# Patient Record
Sex: MALE | URBAN - METROPOLITAN AREA
[De-identification: names, ages, dates, MRNs, and addresses within clinical notes are randomized per-mention and may not be internally consistent; named-entity substitution may affect disease eponyms.]

---

## 2022-05-31 ENCOUNTER — OFFICE VISIT (OUTPATIENT)
Dept: URBAN - METROPOLITAN AREA CLINIC 28 | Facility: CLINIC | Age: 62
End: 2022-05-31
Payer: COMMERCIAL

## 2022-05-31 DIAGNOSIS — H25.13 AGE-RELATED NUCLEAR CATARACT, BILATERAL: ICD-10-CM

## 2022-05-31 DIAGNOSIS — H40.1132 BILATERAL PRIMARY OPEN-ANGLE GLAUCOMA, MODERATE STAGE: Primary | ICD-10-CM

## 2022-05-31 PROCEDURE — 76514 ECHO EXAM OF EYE THICKNESS: CPT | Performed by: OPHTHALMOLOGY

## 2022-05-31 PROCEDURE — 99204 OFFICE O/P NEW MOD 45 MIN: CPT | Performed by: OPHTHALMOLOGY

## 2022-05-31 PROCEDURE — 92133 CPTRZD OPH DX IMG PST SGM ON: CPT | Performed by: OPHTHALMOLOGY

## 2022-05-31 PROCEDURE — 92083 EXTENDED VISUAL FIELD XM: CPT | Performed by: OPHTHALMOLOGY

## 2022-05-31 PROCEDURE — 92020 GONIOSCOPY: CPT | Performed by: OPHTHALMOLOGY

## 2022-05-31 RX ORDER — PREDNISOLONE ACETATE 10 MG/ML
1 % SUSPENSION/ DROPS OPHTHALMIC
Qty: 5 | Refills: 1 | Status: ACTIVE
Start: 2022-05-31

## 2022-05-31 RX ORDER — LATANOPROST 50 UG/ML
0.005 % SOLUTION OPHTHALMIC
Qty: 7.5 | Refills: 3 | Status: ACTIVE
Start: 2022-05-31

## 2022-05-31 ASSESSMENT — INTRAOCULAR PRESSURE
OD: 11
OS: 15

## 2022-05-31 NOTE — IMPRESSION/PLAN
Impression: Bilateral primary open-angle glaucoma, moderate stage: Y15.6063. Plan: Pt has Glaucoma   Gonio : SS 2+   Pachs:496/532    Today's IOP :11/14   Baseline Target IOP Mid teens Pt denies Fhx of Glaucoma Left eye is the better seeing eye HVF 5/31/22 OU inferior nasal step encroaching temporal 
C/D:  0.9x0.9 superior notch/0.9x0.9 OCT:65/61 5/31/22 Pt denies Sulfa Allergy   // Pt denies Lung /Heart dx Plan :
1. Continue Latanoprost (greenstone) QHS OU. Discussed details about Glaucoma and that without proper control of pressures irreversible blindness can occur. Patient understands risks. Emphasize compliance with drop and without compliance vision loss progression can occur. 2.  Discussed with patient due to findings on Visual Field, OCT, and posterior examination, Additional  treatment is recommended for Glaucoma OS ONLY (OD is stable at this time). IOP is too high for the level of glaucomatous damage at the present time. Recommend lowering IOP. Discussed options of SLT vs additional topical drops, patient opts for SLT. 3.  Recommend SLT OS  360 The patient is aware of the limitations of SLT , which can lower IOP 2-4mm for 6--12 months. The Patient is aware that SLT cannot improve the vision nor eliminate the need for the topical medications. If on any glaucoma medications, the patient is aware that SLT is not a replacement for the current medical regimen. **Risk level 1
*** 6-8 week Follow up after laser **Pred TID x 1 week

## 2022-08-23 ENCOUNTER — OFFICE VISIT (OUTPATIENT)
Dept: URBAN - METROPOLITAN AREA CLINIC 28 | Facility: CLINIC | Age: 62
End: 2022-08-23
Payer: COMMERCIAL

## 2022-08-23 DIAGNOSIS — H25.13 AGE-RELATED NUCLEAR CATARACT, BILATERAL: ICD-10-CM

## 2022-08-23 DIAGNOSIS — H40.1132 BILATERAL PRIMARY OPEN-ANGLE GLAUCOMA, MODERATE STAGE: Primary | ICD-10-CM

## 2022-08-23 PROCEDURE — 99213 OFFICE O/P EST LOW 20 MIN: CPT | Performed by: OPHTHALMOLOGY

## 2022-08-23 ASSESSMENT — INTRAOCULAR PRESSURE
OS: 15
OD: 12

## 2022-08-23 NOTE — IMPRESSION/PLAN
Impression: Bilateral primary open-angle glaucoma, moderate stage: S39.5866. Plan: Pt has Glaucoma   Gonio : SS 2+   Pachs:496/532    Today's IOP :12/15   Baseline Target IOP Mid teens Pt denies Fhx of Glaucoma Left eye is the better seeing eye HVF 5/31/22 OU inferior nasal step encroaching temporal 
C/D:  0.9x0.9 superior notch/0.9x0.9 OCT:65/61 5/31/22 Pt denies Sulfa Allergy   // Pt denies Lung /Heart dx Plan :
1. Continue Latanoprost (greenstone) QHS OU. Discussed details about Glaucoma and that without proper control of pressures irreversible blindness can occur. Patient understands risks. Emphasize compliance with drop and without compliance vision loss progression can occur. 2.  Pt is hesitant about SLT at this time and requests a repeat of testing to see if the scotoma is true or worsening 3.  Return in 2 months for repeat HVF 24/2 and RNFL OU - if worsening then discuss SLT again

## 2022-10-19 ENCOUNTER — OFFICE VISIT (OUTPATIENT)
Dept: URBAN - METROPOLITAN AREA CLINIC 24 | Facility: CLINIC | Age: 62
End: 2022-10-19
Payer: COMMERCIAL

## 2022-10-19 DIAGNOSIS — H40.1132 BILATERAL PRIMARY OPEN-ANGLE GLAUCOMA, MODERATE STAGE: Primary | ICD-10-CM

## 2022-10-19 DIAGNOSIS — H25.13 AGE-RELATED NUCLEAR CATARACT, BILATERAL: ICD-10-CM

## 2022-10-19 PROCEDURE — 99213 OFFICE O/P EST LOW 20 MIN: CPT | Performed by: OPHTHALMOLOGY

## 2022-10-19 PROCEDURE — 92133 CPTRZD OPH DX IMG PST SGM ON: CPT | Performed by: OPHTHALMOLOGY

## 2022-10-19 PROCEDURE — 92083 EXTENDED VISUAL FIELD XM: CPT | Performed by: OPHTHALMOLOGY

## 2022-10-19 ASSESSMENT — INTRAOCULAR PRESSURE
OD: 11
OS: 14

## 2022-10-19 NOTE — IMPRESSION/PLAN
Impression: Bilateral primary open-angle glaucoma, moderate stage: Z93.7019. Plan: Pt has Glaucoma   Gonio : SS 2+   Pachs:496/532    Today's IOP :11/14  Tmax 12/15 Target IOP Mid teens Pt denies Fhx of Glaucoma Left eye is the better seeing eye HVF 10/196/22 OU inferior nasal step OU Questionable test reliability C/D:  0.9x0.9 superior notch/0.9x0.9 OCT:64/62 10/19/22 Pt denies Sulfa Allergy   // Pt denies Lung /Heart dx Plan :
1. Continue Latanoprost (greenstone) QHS OU. Discussed details about Glaucoma and that without proper control of pressures irreversible blindness can occur. Patient understands risks. Emphasize compliance with drop and without compliance vision loss progression can occur. 2.   IOP and condition appear stable today. No changes being made to current regimen. Recommend monitoring condition at this time. 
3. RTC 6 months IOP & VF ( If progression plan for SLT)

## 2023-09-26 ENCOUNTER — OFFICE VISIT (OUTPATIENT)
Dept: URBAN - METROPOLITAN AREA CLINIC 24 | Facility: CLINIC | Age: 63
End: 2023-09-26
Payer: COMMERCIAL

## 2023-09-26 DIAGNOSIS — H40.1132 BILATERAL PRIMARY OPEN-ANGLE GLAUCOMA, MODERATE STAGE: Primary | ICD-10-CM

## 2023-09-26 DIAGNOSIS — H25.13 AGE-RELATED NUCLEAR CATARACT, BILATERAL: ICD-10-CM

## 2023-09-26 PROCEDURE — 92083 EXTENDED VISUAL FIELD XM: CPT | Performed by: OPHTHALMOLOGY

## 2023-09-26 PROCEDURE — 99212 OFFICE O/P EST SF 10 MIN: CPT | Performed by: OPHTHALMOLOGY

## 2023-09-26 RX ORDER — LATANOPROST 50 UG/ML
0.005 % SOLUTION OPHTHALMIC
Qty: 7.5 | Refills: 3 | Status: ACTIVE
Start: 2023-09-26

## 2023-09-26 ASSESSMENT — INTRAOCULAR PRESSURE
OS: 12
OD: 11

## 2024-10-14 ENCOUNTER — OFFICE VISIT (OUTPATIENT)
Dept: URBAN - METROPOLITAN AREA CLINIC 44 | Facility: CLINIC | Age: 64
End: 2024-10-14
Payer: MEDICARE

## 2024-10-14 DIAGNOSIS — H40.1132 BILATERAL PRIMARY OPEN-ANGLE GLAUCOMA, MODERATE STAGE: Primary | ICD-10-CM

## 2024-10-14 DIAGNOSIS — H25.13 AGE-RELATED NUCLEAR CATARACT, BILATERAL: ICD-10-CM

## 2024-10-14 PROCEDURE — 99213 OFFICE O/P EST LOW 20 MIN: CPT | Performed by: OPHTHALMOLOGY

## 2024-10-14 PROCEDURE — 92133 CPTRZD OPH DX IMG PST SGM ON: CPT | Performed by: OPHTHALMOLOGY

## 2024-10-14 PROCEDURE — 92083 EXTENDED VISUAL FIELD XM: CPT | Performed by: OPHTHALMOLOGY

## 2024-10-14 ASSESSMENT — INTRAOCULAR PRESSURE
OS: 12
OD: 11

## 2025-02-24 ENCOUNTER — OFFICE VISIT (OUTPATIENT)
Dept: URBAN - METROPOLITAN AREA CLINIC 44 | Facility: CLINIC | Age: 65
End: 2025-02-24
Payer: MEDICARE

## 2025-02-24 DIAGNOSIS — H40.1132 BILATERAL PRIMARY OPEN-ANGLE GLAUCOMA, MODERATE STAGE: Primary | ICD-10-CM

## 2025-02-24 PROCEDURE — 92133 CPTRZD OPH DX IMG PST SGM ON: CPT | Performed by: OPHTHALMOLOGY

## 2025-02-24 PROCEDURE — 99214 OFFICE O/P EST MOD 30 MIN: CPT | Performed by: OPHTHALMOLOGY

## 2025-02-24 PROCEDURE — 92083 EXTENDED VISUAL FIELD XM: CPT | Performed by: OPHTHALMOLOGY

## 2025-02-24 RX ORDER — LATANOPROST 50 UG/ML
0.005 % SOLUTION OPHTHALMIC
Qty: 7.5 | Refills: 3 | Status: ACTIVE
Start: 2025-02-24

## 2025-02-24 ASSESSMENT — INTRAOCULAR PRESSURE
OD: 13
OS: 14

## 2025-03-17 ENCOUNTER — SURGERY (OUTPATIENT)
Dept: URBAN - METROPOLITAN AREA SURGERY 19 | Facility: SURGERY | Age: 65
End: 2025-03-17
Payer: MEDICARE